# Patient Record
Sex: MALE | URBAN - METROPOLITAN AREA
[De-identification: names, ages, dates, MRNs, and addresses within clinical notes are randomized per-mention and may not be internally consistent; named-entity substitution may affect disease eponyms.]

---

## 2024-07-17 ENCOUNTER — HOSPITAL ENCOUNTER (EMERGENCY)
Facility: HOSPITAL | Age: 34
Discharge: HOME | End: 2024-07-17
Attending: STUDENT IN AN ORGANIZED HEALTH CARE EDUCATION/TRAINING PROGRAM
Payer: MEDICAID

## 2024-07-17 VITALS
HEART RATE: 80 BPM | TEMPERATURE: 98 F | WEIGHT: 187.38 LBS | OXYGEN SATURATION: 97 % | HEIGHT: 68 IN | RESPIRATION RATE: 20 BRPM | DIASTOLIC BLOOD PRESSURE: 73 MMHG | BODY MASS INDEX: 28.4 KG/M2 | SYSTOLIC BLOOD PRESSURE: 121 MMHG

## 2024-07-17 DIAGNOSIS — H92.02 OTALGIA OF LEFT EAR: ICD-10-CM

## 2024-07-17 DIAGNOSIS — H60.90 OTITIS EXTERNA, UNSPECIFIED CHRONICITY, UNSPECIFIED LATERALITY, UNSPECIFIED TYPE: Primary | ICD-10-CM

## 2024-07-17 DIAGNOSIS — H66.90 OTITIS MEDIA, UNSPECIFIED LATERALITY, UNSPECIFIED OTITIS MEDIA TYPE: ICD-10-CM

## 2024-07-17 PROCEDURE — 25000003 PHARM REV CODE 250: Performed by: EMERGENCY MEDICINE

## 2024-07-17 PROCEDURE — 99284 EMERGENCY DEPT VISIT MOD MDM: CPT | Mod: 25

## 2024-07-17 PROCEDURE — 63600175 PHARM REV CODE 636 W HCPCS: Performed by: EMERGENCY MEDICINE

## 2024-07-17 PROCEDURE — 96372 THER/PROPH/DIAG INJ SC/IM: CPT | Performed by: PHYSICIAN ASSISTANT

## 2024-07-17 PROCEDURE — 96372 THER/PROPH/DIAG INJ SC/IM: CPT | Performed by: EMERGENCY MEDICINE

## 2024-07-17 PROCEDURE — 63600175 PHARM REV CODE 636 W HCPCS: Performed by: PHYSICIAN ASSISTANT

## 2024-07-17 RX ORDER — KETOROLAC TROMETHAMINE 30 MG/ML
30 INJECTION, SOLUTION INTRAMUSCULAR; INTRAVENOUS
Status: DISCONTINUED | OUTPATIENT
Start: 2024-07-17 | End: 2024-07-17

## 2024-07-17 RX ORDER — DEXAMETHASONE 4 MG/1
8 TABLET ORAL
Status: COMPLETED | OUTPATIENT
Start: 2024-07-17 | End: 2024-07-17

## 2024-07-17 RX ORDER — ACETAMINOPHEN 500 MG
1000 TABLET ORAL
Status: COMPLETED | OUTPATIENT
Start: 2024-07-17 | End: 2024-07-17

## 2024-07-17 RX ORDER — KETOROLAC TROMETHAMINE 10 MG/1
10 TABLET, FILM COATED ORAL EVERY 6 HOURS
Qty: 20 TABLET | Refills: 0 | Status: SHIPPED | OUTPATIENT
Start: 2024-07-17 | End: 2024-07-22

## 2024-07-17 RX ORDER — KETOROLAC TROMETHAMINE 30 MG/ML
30 INJECTION, SOLUTION INTRAMUSCULAR; INTRAVENOUS
Status: COMPLETED | OUTPATIENT
Start: 2024-07-17 | End: 2024-07-17

## 2024-07-17 RX ORDER — CEFTRIAXONE 1 G/1
1 INJECTION, POWDER, FOR SOLUTION INTRAMUSCULAR; INTRAVENOUS
Status: COMPLETED | OUTPATIENT
Start: 2024-07-17 | End: 2024-07-17

## 2024-07-17 RX ADMIN — ACETAMINOPHEN 1000 MG: 500 TABLET ORAL at 04:07

## 2024-07-17 RX ADMIN — DEXAMETHASONE 8 MG: 4 TABLET ORAL at 04:07

## 2024-07-17 RX ADMIN — CEFTRIAXONE SODIUM 1 G: 1 INJECTION, POWDER, FOR SOLUTION INTRAMUSCULAR; INTRAVENOUS at 04:07

## 2024-07-17 RX ADMIN — KETOROLAC TROMETHAMINE 30 MG: 30 INJECTION, SOLUTION INTRAMUSCULAR at 04:07

## 2024-07-17 NOTE — ED NOTES
Lobby Round. Pt alert and oriented at this time. Vitals assessed. Pt denies any other complaints from initial at this time. Pt does not appear to be in any immediate distress at this time.

## 2024-07-17 NOTE — FIRST PROVIDER EVALUATION
Medical screening examination initiated.  I have conducted a focused provider triage encounter, findings are as follows:    Brief history of present illness:  Patient states bilateral ear pain. States that he has been taking Cefdinir and Ciprodex drops with no improvement.     There were no vitals filed for this visit.    Pertinent physical exam:  Awake, alert, ambulatory    Brief workup plan:  Exam    Preliminary workup initiated; this workup will be continued and followed by the physician or advanced practice provider that is assigned to the patient when roomed.

## 2024-07-17 NOTE — ED PROVIDER NOTES
Encounter Date: 7/17/2024       History     Chief Complaint   Patient presents with    Otalgia     Bilateral ear pain x 2 days, seen at urgent care yesterday and prescribed cefdinir and and ciprodex drops, reports no relief with medications.      33-year-old male presents to the emergency room with bilateral ear pain swelling and drainage, right is worse than left.  Patient evaluated and treated yesterday at an urgent care given antibiotic ear drops and oral antibiotics.  Patient states he is still in pain and having fever.    The history is provided by the patient. No  was used.     Review of patient's allergies indicates:  No Known Allergies  No past medical history on file.  No past surgical history on file.  No family history on file.     Review of Systems   Constitutional: Negative.  Negative for activity change, appetite change, diaphoresis, fatigue and fever.   HENT:  Positive for ear discharge and ear pain. Negative for rhinorrhea and sinus pressure.    Eyes: Negative.    Respiratory: Negative.  Negative for chest tightness.    Cardiovascular:  Negative for chest pain.   Gastrointestinal: Negative.  Negative for abdominal distention and abdominal pain.   Endocrine: Negative.    Genitourinary: Negative.    Musculoskeletal: Negative.  Negative for arthralgias.   Allergic/Immunologic: Negative.    Neurological:  Negative for dizziness and headaches.   Hematological: Negative.    Psychiatric/Behavioral: Negative.         Physical Exam     Initial Vitals [07/17/24 1322]   BP Pulse Resp Temp SpO2   138/88 89 18 97.6 °F (36.4 °C) 97 %      MAP       --         Physical Exam    Constitutional: He appears well-developed and well-nourished. He is cooperative. No distress.   HENT:   Head: Normocephalic and atraumatic. Not macrocephalic.   Right Ear: Tympanic membrane and external ear normal. There is drainage, swelling and tenderness. Tympanic membrane is not erythematous.   Left Ear: Tympanic  membrane and external ear normal. There is drainage, swelling and tenderness. Tympanic membrane is not erythematous.   Nose: No mucosal edema. Right sinus exhibits no frontal sinus tenderness. Left sinus exhibits no frontal sinus tenderness.   Mouth/Throat: Oropharynx is clear and moist and mucous membranes are normal. No oropharyngeal exudate.   Eyes: Conjunctivae and EOM are normal. Pupils are equal, round, and reactive to light. Right eye exhibits no discharge. Left eye exhibits no discharge.   Neck: Neck supple. No tracheal deviation present.   Normal range of motion.  Cardiovascular:  Normal rate and regular rhythm.           Pulmonary/Chest: Effort normal and breath sounds normal. No respiratory distress. He has no decreased breath sounds. He has no wheezes.   Abdominal: Abdomen is soft. Bowel sounds are normal.   Musculoskeletal:         General: Normal range of motion.      Cervical back: Normal range of motion and neck supple.     Lymphadenopathy:        Head (right side): Preauricular and posterior auricular adenopathy present. No submental adenopathy present.        Head (left side): Preauricular and posterior auricular adenopathy present. No submental adenopathy present.     He has no cervical adenopathy.   Tragal tenderness bilaterally   Neurological: He is alert and oriented to person, place, and time. He has normal strength. No cranial nerve deficit. GCS score is 15. GCS eye subscore is 4. GCS verbal subscore is 5. GCS motor subscore is 6.   Skin: Skin is warm.   Psychiatric: He has a normal mood and affect. His behavior is normal. Judgment and thought content normal.         ED Course   Procedures  Labs Reviewed - No data to display       Imaging Results    None          Medications   cefTRIAXone injection 1 g (has no administration in time range)   ketorolac injection 30 mg (30 mg Intramuscular Given 7/17/24 1620)   acetaminophen tablet 1,000 mg (1,000 mg Oral Given 7/17/24 1620)   dexAMETHasone  tablet 8 mg (8 mg Oral Given 7/17/24 1620)     Medical Decision Making  33-year-old male presents to the emergency room with bilateral ear pain swelling and drainage, right is worse than left.  Patient evaluated and treated yesterday at an urgent care given antibiotic ear drops and oral antibiotics.  Patient states he is still in pain and having fever.  Explained to patient that ear infection in both ears can definitely cause fever, chills, and body ache.  Also explained that it can cause swelling of the nodes.  Patient appeared to verbalized understanding of this.  Patient needs to continue medication as prescribed.  Patient given meds for pain in swelling while in the emergency room today.  Patient expressed some concern that the drops were not reaching the inside of his ear canal.  I explained that it is going to take at least 2-3 days to notice a significant improvement in symptoms.  I advised he is still continuing drops.    Problems Addressed:  Otalgia of left ear:     Details: Differential diagnosis included but not limited to:  Otitis media, otitis externa, strep pharyngitis    Risk  Prescription drug management.                                      Clinical Impression:  Final diagnoses:  [H60.90] Otitis externa, unspecified chronicity, unspecified laterality, unspecified type (Primary)  [H92.02] Otalgia of left ear  [H66.90] Otitis media, unspecified laterality, unspecified otitis media type          ED Disposition Condition    Discharge Stable          ED Prescriptions       Medication Sig Dispense Start Date End Date Auth. Provider    ketorolac (TORADOL) 10 mg tablet Take 1 tablet (10 mg total) by mouth every 6 (six) hours. for 5 days 20 tablet 7/17/2024 7/22/2024 Leatha Brooks PA          Follow-up Information       Follow up With Specialties Details Why Contact Info    [facility] - Emergency Dept Emergency Medicine  As needed, If symptoms worsen [address]  [place] [address]  [phone]             Leatha Brooks PA  07/17/24 5631

## 2024-08-12 ENCOUNTER — OFFICE VISIT (OUTPATIENT)
Dept: FAMILY MEDICINE | Facility: CLINIC | Age: 34
End: 2024-08-12
Payer: MEDICAID

## 2024-08-12 VITALS
SYSTOLIC BLOOD PRESSURE: 129 MMHG | WEIGHT: 184.81 LBS | BODY MASS INDEX: 28.01 KG/M2 | HEIGHT: 68 IN | OXYGEN SATURATION: 100 % | DIASTOLIC BLOOD PRESSURE: 88 MMHG | TEMPERATURE: 98 F | HEART RATE: 69 BPM

## 2024-08-12 DIAGNOSIS — Z76.89 ENCOUNTER TO ESTABLISH CARE: ICD-10-CM

## 2024-08-12 DIAGNOSIS — J06.9 VIRAL UPPER RESPIRATORY INFECTION: Primary | ICD-10-CM

## 2024-08-12 DIAGNOSIS — R05.1 ACUTE COUGH: ICD-10-CM

## 2024-08-12 LAB
ABS NEUT CALC (OHS): 0.69 X10(3)/MCL (ref 2.1–9.2)
ALBUMIN SERPL-MCNC: 3.8 G/DL (ref 3.5–5)
ALBUMIN/GLOB SERPL: 0.9 RATIO (ref 1.1–2)
ALP SERPL-CCNC: 72 UNIT/L (ref 40–150)
ALT SERPL-CCNC: 90 UNIT/L (ref 0–55)
ANION GAP SERPL CALC-SCNC: 9 MEQ/L
AST SERPL-CCNC: 56 UNIT/L (ref 5–34)
BASOPHILS NFR BLD MANUAL: 0.03 X10(3)/MCL (ref 0–0.2)
BASOPHILS NFR BLD MANUAL: 1 % (ref 0–2)
BILIRUB SERPL-MCNC: 0.2 MG/DL
BUN SERPL-MCNC: 17.3 MG/DL (ref 8.9–20.6)
CALCIUM SERPL-MCNC: 9.4 MG/DL (ref 8.4–10.2)
CHLORIDE SERPL-SCNC: 102 MMOL/L (ref 98–107)
CHOLEST SERPL-MCNC: 243 MG/DL
CHOLEST/HDLC SERPL: 6 {RATIO} (ref 0–5)
CO2 SERPL-SCNC: 26 MMOL/L (ref 22–29)
CREAT SERPL-MCNC: 1 MG/DL (ref 0.73–1.18)
CREAT/UREA NIT SERPL: 17
EOSINOPHIL NFR BLD MANUAL: 0.06 X10(3)/MCL (ref 0–0.9)
EOSINOPHIL NFR BLD MANUAL: 2 % (ref 0–8)
ERYTHROCYTE [DISTWIDTH] IN BLOOD BY AUTOMATED COUNT: 12.6 % (ref 11.5–17)
EST. AVERAGE GLUCOSE BLD GHB EST-MCNC: 122.6 MG/DL
FLUAV AG UPPER RESP QL IA.RAPID: NOT DETECTED
FLUBV AG UPPER RESP QL IA.RAPID: NOT DETECTED
GFR SERPLBLD CREATININE-BSD FMLA CKD-EPI: >60 ML/MIN/1.73/M2
GLOBULIN SER-MCNC: 4.3 GM/DL (ref 2.4–3.5)
GLUCOSE SERPL-MCNC: 86 MG/DL (ref 74–100)
HBA1C MFR BLD: 5.9 %
HCT VFR BLD AUTO: 47.3 % (ref 42–52)
HDLC SERPL-MCNC: 39 MG/DL (ref 35–60)
HGB BLD-MCNC: 15.3 G/DL (ref 14–18)
LDLC SERPL CALC-MCNC: 166 MG/DL (ref 50–140)
LYMPHOCYTES NFR BLD MANUAL: 1.82 X10(3)/MCL
LYMPHOCYTES NFR BLD MANUAL: 58 % (ref 13–40)
MCH RBC QN AUTO: 25 PG (ref 27–31)
MCHC RBC AUTO-ENTMCNC: 32.3 G/DL (ref 33–36)
MCV RBC AUTO: 77.4 FL (ref 80–94)
MONOCYTES NFR BLD MANUAL: 0.53 X10(3)/MCL (ref 0.1–1.3)
MONOCYTES NFR BLD MANUAL: 17 % (ref 2–11)
NEUTROPHILS NFR BLD MANUAL: 22 % (ref 47–80)
NRBC BLD AUTO-RTO: 0 %
PLATELET # BLD AUTO: 243 X10(3)/MCL (ref 130–400)
PLATELET # BLD EST: ADEQUATE 10*3/UL
PMV BLD AUTO: 10.1 FL (ref 7.4–10.4)
POTASSIUM SERPL-SCNC: 3.9 MMOL/L (ref 3.5–5.1)
PROT SERPL-MCNC: 8.1 GM/DL (ref 6.4–8.3)
RBC # BLD AUTO: 6.11 X10(6)/MCL (ref 4.7–6.1)
RBC MORPH BLD: NORMAL
RSV A 5' UTR RNA NPH QL NAA+PROBE: NOT DETECTED
SARS-COV-2 RNA RESP QL NAA+PROBE: DETECTED
SODIUM SERPL-SCNC: 137 MMOL/L (ref 136–145)
TRIGL SERPL-MCNC: 188 MG/DL (ref 34–140)
VLDLC SERPL CALC-MCNC: 38 MG/DL
WBC # BLD AUTO: 3.14 X10(3)/MCL (ref 4.5–11.5)

## 2024-08-12 PROCEDURE — 80053 COMPREHEN METABOLIC PANEL: CPT

## 2024-08-12 PROCEDURE — 80061 LIPID PANEL: CPT

## 2024-08-12 PROCEDURE — 83036 HEMOGLOBIN GLYCOSYLATED A1C: CPT

## 2024-08-12 PROCEDURE — 99213 OFFICE O/P EST LOW 20 MIN: CPT | Mod: PBBFAC

## 2024-08-12 PROCEDURE — 85007 BL SMEAR W/DIFF WBC COUNT: CPT

## 2024-08-12 PROCEDURE — 0241U COVID/RSV/FLU A&B PCR: CPT

## 2024-08-12 PROCEDURE — 36415 COLL VENOUS BLD VENIPUNCTURE: CPT

## 2024-08-12 NOTE — PROGRESS NOTES
I have discussed the case with the resident and reviewed the resident's history and physical, assessment, plan, and progress note. I agree with the findings.       Edwar Dangelo MD  [facility] - Family Medicine

## 2024-08-12 NOTE — PROGRESS NOTES
"[facility]    Subjective:      Chief Complaint: Establish Care and C/O FEVER,COUGH AND CHILLS      HPI   Fabio Barakat is a 33 y.o. male who presents to [facility] for subjective fever/chills and cough x4 days.  Denies sick contacts.  States he had fever for 2 days in the since resolved, never measured at home.  Intermittent cough for the past 2 days seems to be resolving.  Minimal maxillary sinus congestion without sinus pressure.  Complains of postnasal drip.  Taking OTC cold and congestion medication with moderate relief.  Wife is pregnant so he has been staying away from home since his symptoms started and is requesting COVID testing given multiple kids and pregnant wife at home. Denies SOB, chest pain, abdominal pain, n/v, diarrhea.  PO intake unchanged.  Seen in the ED multiple x1 month ago and diagnosed with bilateral otitis externa and otitis media for which he was since completed treatment and is having no symptoms.  Denies any other complaints.  Requesting basic lab work today.    Review of Systems  As per HPI.    Objective:     /88 (BP Location: Right arm, Patient Position: Sitting, BP Method: Medium (Automatic))   Pulse 69   Temp 98.2 °F (36.8 °C) (Oral)   Ht 5' 8" (1.727 m)   Wt 83.8 kg (184 lb 12.8 oz)   SpO2 100%   BMI 28.10 kg/m²     Physical Exam:  Gen:  No acute distress, mask in place  HEENT: Difficult to visualize TM due to cerumen in ear canal, right TM appears to be ruptured, Left TM intact without effusion or erythema. No drainage bilaterally. Throat clear without erythema or exudates. MMM.   CV: RRR, no murmurs. No LE edema.  Resp: CTAB, breathing non labored on room air.  Abd: Soft, non-distended, non-tender, bowel sounds normoactive.    No current outpatient medications on file.     Assessment/Plan:     Viral upper respiratory infection  Acute cough  - Covid/RSV/Flu ordered  - Wash hands frequently, wear mask when appropriate  - Avoid large public " gatherings    Encounter to establish care  - Labs ordered: CBC, CMP, A1c, lipid panel  - Past labs reviewed from outside facility, elevated  however did not meet standard to require treatment    Follow-up:  6 months    Aroldo Mendoza MD   \[facility] [place] [facility]\"" Family Medicine - PGY-III

## 2025-04-30 ENCOUNTER — OFFICE VISIT (OUTPATIENT)
Dept: FAMILY MEDICINE | Facility: CLINIC | Age: 35
End: 2025-04-30
Payer: MEDICAID

## 2025-04-30 VITALS
OXYGEN SATURATION: 98 % | RESPIRATION RATE: 20 BRPM | SYSTOLIC BLOOD PRESSURE: 121 MMHG | WEIGHT: 190 LBS | TEMPERATURE: 98 F | HEART RATE: 77 BPM | HEIGHT: 68 IN | DIASTOLIC BLOOD PRESSURE: 85 MMHG | BODY MASS INDEX: 28.79 KG/M2

## 2025-04-30 DIAGNOSIS — Z00.00 HEALTHCARE MAINTENANCE: ICD-10-CM

## 2025-04-30 DIAGNOSIS — R73.02 IMPAIRED GLUCOSE TOLERANCE: Primary | ICD-10-CM

## 2025-04-30 PROCEDURE — 99213 OFFICE O/P EST LOW 20 MIN: CPT | Mod: PBBFAC

## 2025-04-30 NOTE — PROGRESS NOTES
"[facility]    Subjective:      Chief Complaint: Health Maintenance      HPI   Fabio Barakat is a 34 y.o. male who presents to [facility] for health maintenance. Doing well with no acute issues. He has no concerns about his health. Takes no medications daily. Labs from last visit in August 2024 showed elevated LDL and cholesterol levels, A1c in prediabetic range, and elevated liver enzymes. Patient had COVID at the time which has the potential to lead to mild transaminitis. He would prefer to repeat labs at his next visit. Denies chest pain, SOB, abdominal pain. No family hx of cardiovascular dz, no family hx of colon cancer. Does not smoke, drink, or use drugs. Does not follow any particular diet, admits to excessive carbohydrates in diet and minimal exercise.     Problem list  - Prediabetes    Review of Systems  As per HPI.    Objective:     /85 (BP Location: Right arm, Patient Position: Sitting)   Pulse 77   Temp 98 °F (36.7 °C) (Oral)   Resp 20   Ht 5' 8" (1.727 m)   Wt 86.2 kg (190 lb)   SpO2 98%   BMI 28.89 kg/m²     Physical Exam:  Gen: No acute distress, pleasant male  CV: RRR, no murmurs. No LE edema.  Resp: CTAB  Abd: Soft, non-distended, non-tender, bowel sounds normoactive, no palpable masses.     No current outpatient medications    Assessment/Plan:     Prediabetes  Wellness exam  Health maintenance  - Vaccinations: informed refusal of tetanus  - Reviewed labs from 8/2024 with patient  - Discussed dietary changes and exercise to keep BMI <25 especially in the setting of A1c 5.9  - Discussed initiating metformin, shared decision making to attempt lifestyle mods  - Patient opting to repeat labs at next visit  - If transaminitis persists on repeat labs would consider RUQ US    Follow-up: 6 months for prediabetes, general wellness    Aroldo Mendoza MD   [facility] Family Medicine - PGY-III  "

## 2025-05-01 NOTE — PROGRESS NOTES
Faculty Attestation    I have reveiwed and agree with the resident's findings, including all diagnostic interpretations and plans as written.    Dania Nieves MD

## 2025-07-02 ENCOUNTER — HOSPITAL ENCOUNTER (EMERGENCY)
Facility: HOSPITAL | Age: 35
Discharge: HOME | End: 2025-07-02
Attending: STUDENT IN AN ORGANIZED HEALTH CARE EDUCATION/TRAINING PROGRAM
Payer: MEDICAID

## 2025-07-02 VITALS
OXYGEN SATURATION: 97 % | TEMPERATURE: 98 F | HEART RATE: 102 BPM | BODY MASS INDEX: 30.49 KG/M2 | DIASTOLIC BLOOD PRESSURE: 84 MMHG | WEIGHT: 183 LBS | SYSTOLIC BLOOD PRESSURE: 144 MMHG | HEIGHT: 65 IN | RESPIRATION RATE: 20 BRPM

## 2025-07-02 DIAGNOSIS — M25.571 RIGHT ANKLE PAIN: ICD-10-CM

## 2025-07-02 DIAGNOSIS — S93.401A SPRAIN OF RIGHT ANKLE, UNSPECIFIED LIGAMENT, INITIAL ENCOUNTER: Primary | ICD-10-CM

## 2025-07-02 PROCEDURE — 63600175 PHARM REV CODE 636 W HCPCS: Mod: JZ,TB

## 2025-07-02 PROCEDURE — 96372 THER/PROPH/DIAG INJ SC/IM: CPT

## 2025-07-02 PROCEDURE — 99284 EMERGENCY DEPT VISIT MOD MDM: CPT | Mod: 25

## 2025-07-02 RX ORDER — DICLOFENAC SODIUM 75 MG/1
75 TABLET, DELAYED RELEASE ORAL 2 TIMES DAILY PRN
Qty: 20 TABLET | Refills: 0 | Status: SHIPPED | OUTPATIENT
Start: 2025-07-02

## 2025-07-02 RX ORDER — KETOROLAC TROMETHAMINE 30 MG/ML
60 INJECTION, SOLUTION INTRAMUSCULAR; INTRAVENOUS
Status: COMPLETED | OUTPATIENT
Start: 2025-07-02 | End: 2025-07-02

## 2025-07-02 RX ADMIN — KETOROLAC TROMETHAMINE 60 MG: 60 INJECTION, SOLUTION INTRAMUSCULAR at 05:07

## 2025-07-02 NOTE — FIRST PROVIDER EVALUATION
"Medical screening examination initiated.  I have conducted a focused provider triage encounter, findings are as follows:    Brief history of present illness:  34-year-old male presents to the emergency department with complaints of right ankle pain and swelling after fall yesterday.    Vitals:    07/02/25 1328   BP: (!) 144/84   BP Location: Left arm   Pulse: 102   Resp: 20   Temp: 98.3 °F (36.8 °C)   TempSrc: Oral   SpO2: 97%   Weight: 83 kg (183 lb)   Height: 5' 5" (1.651 m)       Pertinent physical exam:  Awake and alert, NAD    Brief workup plan:  Imaging    Preliminary workup initiated; this workup will be continued and followed by the physician or advanced practice provider that is assigned to the patient when roomed.  "

## 2025-07-02 NOTE — ED PROVIDER NOTES
Encounter Date: 7/2/2025       History     Chief Complaint   Patient presents with    Ankle Pain     Pt c/o R ankle pain and swelling s/p fall while playing with his children yesterday. No meds taken PTA. NVC intact.      The patient is a 34 y.o. male who presents to the Emergency Department with a chief complaint of right ankle pain. Patient reports fall yesterday while playing with son. He has been ambulating on the extremity but has worsening pain with ambulation. Symptoms began yesterday and have been constant since onset. His pain is currently rated as a 6/10 in severity and described as aching with no radiation. Associated symptoms include swelling. Symptoms are aggravated with ambulation and there are no alleviating factors. The patient denies numbness or tingling to extremity. He reports taking nothing prior to arrival with no relief of symptoms. No other reported symptoms at this time     The history is provided by the patient. No  was used.   Ankle Pain  This is a new problem. The current episode started yesterday. The problem occurs daily. The problem has not changed since onset.Pertinent negatives include no chest pain, no abdominal pain, no headaches and no shortness of breath. The symptoms are aggravated by bending and walking. Nothing relieves the symptoms. He has tried nothing for the symptoms. The treatment provided no relief.     Review of patient's allergies indicates:  No Known Allergies  History reviewed. No pertinent past medical history.  History reviewed. No pertinent surgical history.  No family history on file.  Social History[1]  Review of Systems   Constitutional:  Negative for fever.   HENT:  Negative for sore throat.    Respiratory:  Negative for shortness of breath.    Cardiovascular:  Negative for chest pain.   Gastrointestinal:  Negative for abdominal pain and nausea.   Genitourinary:  Negative for dysuria.   Musculoskeletal:  Positive for arthralgias and joint  swelling. Negative for back pain.   Skin:  Negative for rash.   Neurological:  Negative for weakness and headaches.   Hematological:  Does not bruise/bleed easily.   All other systems reviewed and are negative.      Physical Exam     Initial Vitals [07/02/25 1328]   BP Pulse Resp Temp SpO2   (!) 144/84 102 20 98.3 °F (36.8 °C) 97 %      MAP       --         Physical Exam    Nursing note and vitals reviewed.  Constitutional: Vital signs are normal. He appears well-developed and well-nourished.   HENT:   Head: Normocephalic.   Right Ear: Hearing and tympanic membrane normal.   Left Ear: Hearing and tympanic membrane normal. Mouth/Throat: Uvula is midline, oropharynx is clear and moist and mucous membranes are normal.   Cardiovascular:  Normal rate, regular rhythm, normal heart sounds and normal pulses.           Pulmonary/Chest: Effort normal and breath sounds normal.   Abdominal: Abdomen is soft. Bowel sounds are normal. There is no abdominal tenderness.   Musculoskeletal:      Cervical back: No spinous process tenderness or muscular tenderness.      Right ankle: Swelling present. Tenderness present. Normal range of motion. Normal pulse.      Left ankle: Normal. Normal pulse.      Comments: All other adjacent joints normal      Lymphadenopathy:     He has no cervical adenopathy.   Neurological: He is alert. GCS eye subscore is 4. GCS verbal subscore is 5. GCS motor subscore is 6.   Skin: Skin is warm, dry and intact. Capillary refill takes less than 2 seconds.         ED Course   Procedures  Labs Reviewed - No data to display       Imaging Results              X-Ray Ankle Complete Right (Final result)  Result time 07/02/25 13:56:26      Final result by Brynn Prasad MD (07/02/25 13:56:26)                   Impression:      No acute bony abnormality.      Electronically signed by: Brynn Prasad  Date:    07/02/2025  Time:    13:56               Narrative:    EXAMINATION:  XR ANKLE COMPLETE 3 VIEW  RIGHT    CLINICAL HISTORY:  Pain in right ankle and joints of right foot    COMPARISON:  None.    FINDINGS:  There is no acute fracture or malalignment.  There is soft tissue swelling around the ankle.                                       Medications   ketorolac injection 60 mg (60 mg Intramuscular Given 7/2/25 1742)     Medical Decision Making  The patient is a 34 y.o. male who presents to the Emergency Department with a chief complaint of right ankle pain. Patient reports fall yesterday while playing with son. He has been ambulating on the extremity but has worsening pain with ambulation. Symptoms began yesterday and have been constant since onset. His pain is currently rated as a 6/10 in severity and described as aching with no radiation. Associated symptoms include swelling. Symptoms are aggravated with ambulation and there are no alleviating factors. The patient denies numbness or tingling to extremity. He reports taking nothing prior to arrival with no relief of symptoms. No other reported symptoms at this time     Judging by the patient's chief complaint and pertinent history, the patient has the following possible differential diagnoses, including but not limited to the following.  Some of these are deemed to be lower likelihood and some more likely based on my physical exam and history combined with possible lab work and/or imaging studies.   Please see the pertinent studies, and refer to the HPI.  Some of these diagnoses will take further evaluation to fully rule out, perhaps as an outpatient and the patient was encouraged to follow up when discharged for more comprehensive evaluation.    Ankle fracture, ankle sprain     Amount and/or Complexity of Data Reviewed  Radiology: ordered.    Risk  Prescription drug management.                                      Clinical Impression:  Final diagnoses:  [M25.571] Right ankle pain  [S93.401A] Sprain of right ankle, unspecified ligament, initial encounter  (Primary)          ED Disposition Condition    Discharge Stable          ED Prescriptions       Medication Sig Dispense Start Date End Date Auth. Provider    diclofenac (VOLTAREN) 75 MG EC tablet Take 1 tablet (75 mg total) by mouth 2 (two) times daily as needed (pain). 20 tablet 7/2/2025 -- Chuy Zapien NP          Follow-up Information       Follow up With Specialties Details Why Contact Info    Please contact [phone] to establish care with a primary care provider  Schedule an appointment as soon as possible for a visit                      [1]   Social History  Tobacco Use    Smoking status: Never    Smokeless tobacco: Never        Chuy Zapien NP  07/02/25 5153